# Patient Record
(demographics unavailable — no encounter records)

---

## 2017-07-14 NOTE — OR
DATE:  07/14/2017

 

PREOPERATIVE DIAGNOSES:

1. Abdominal bleeding.

2. Developmentally disabled.

 

POSTOPERATIVE DIAGNOSES:

1. Abdominal bleeding.

2. Developmentally disabled.

3. Simple right ovarian cyst and irregular endometrial cavity.

 

PROCEDURES:  Exam under anesthesia, Pap smear, endometrial biopsy, dilation and

curettage, and ultrasound transvaginal and transabdominal under anesthesia.

 

ESTIMATED BLOOD LOSS:  5 mL.

 

FLUIDS:  Crystalloid/LR.

 

DRAINS:  None.

 

PATHOLOGY:  Endometrial curettings sent.  Pap smear sent.

 

ANESTHESIA:  General.

 

FINDINGS:  Irregular endometrial cavity with right ovarian cyst, unable to see

left ovary on ultrasound.

 

PROCEDURE IN DETAIL:  The patient was taken back to the operating room.  She was

placed supine on the operating table after IV access was obtained.  General

anesthesia was accomplished.  The patient was placed in the dorsal lithotomy

position.  Pap smear was then accomplished.  Then, a transabdominal ultrasound

was accomplished.  The patient was then prepped and draped in usual sterile

fashion in the dorsal lithotomy position.  Anterior lip of the cervix was

grasped with a single-tooth tenaculum.  Endometrial biopsy was accomplished.

Then, the cervix was dilated up to a #8 Hegar dilator, and a D and C was then

accomplished.  All tissue was sent to Pathology.  The instruments were removed

from the vagina.  Bimanual examination revealed moderate-size uterus.  The

cervix, vagina, external genitalia, all appeared normal.  Extremities:  No

edema, erythema, or tenderness noted.  Abdomen:  Soft, nontender.  Bowel sounds

good.  No rigidity, rebound tenderness, or peritoneal signs noted.  Breasts:  No

erythema, lesions, dimpling, retractions, or discharge noted.  No masses or

lesions noted.  Axilla:  No adenopathy noted.  Lungs:  Clear to auscultation.

No wheezing, rhonchi, or rales noted.  Cardiovascular:  Regular rate and rhythm

without murmurs.  Neck:  No masses, fullness, or lesions noted.  The patient

tolerated the procedure quite well.  All sponges, needle, and instrument counts

were correct by the nurse in attendance x2.  The patient was taken to PACU awake

and in stable condition.  The patient did receive 2 g of Ancef IV.

 

DD:  07/14/2017 09:15:46

DT:  07/14/2017 12:54:12

MODL

Job #:  [id number]

## 2018-08-12 NOTE — EDM.PDOC
ED HPI GENERAL MEDICAL PROBLEM





- General


Chief Complaint: Upper Extremity Injury/Pain


Stated Complaint: HAND PAIN [id number]


Time Seen by Provider: 08/12/18 20:50


Source of Information: Reports: Other ([facility] staff)


History Limitations: Reports: No Limitations





- History of Present Illness


INITIAL COMMENTS - FREE TEXT/NARRATIVE: 





 ED with [facility] staff, fell  forward Friday, Landing outstretched hands. 

Has not been using left wrist since. Staff report hx frequent fall.  Patient 

nonverbal. Prior to falls would  object with both hands Now will move 

arm but avoid grasping. 


  ** Left Hand


Pain Score (Numeric/FACES): 4





- Related Data


 Allergies











Allergy/AdvReac Type Severity Reaction Status Date / Time


 


ketamine Allergy  Cannot Verified 08/12/18 19:37





   Remember  











Home Meds: 


 Home Meds





Calcium Carb & Citrate/Vit D3 [Calcium + Vitamin D3 Caplet] 1 each PO BID 04/05/ 14 [History]


Lactulose [Chronulac] 2 tbsp PO BEDTIME 04/05/14 [History]


Levothyroxine Sodium [Synthroid] 50 mcg PO DAILY 04/05/14 [History]


Propranolol [Inderal] 10 mg PO TID 04/05/14 [History]


Valproic Acid (As Sodium Salt) [Valproic Acid] 500 mg PO TID 04/05/14 [History]


lamoTRIgine [LaMICtal] 75 mg PO BID 04/05/14 [History]


medroxyPROGESTERone Acetate [Depo-Provera] 150 mg IM .[id number] 04/05/14 [History]


atorvaSTATin [Lipitor] 1 tab PO BEDTIME 05/26/16 [History]


Benzonatate 200 mg PO TID PRN 06/08/17 [History]


Carbamide Peroxide [Debrox 6.5% Otic Soln] 1 drop EARBOTH ASDIRECTED 06/08/17 [

History]


Loratadine 1 tab PO DAILY PRN 06/08/17 [History]


metFORMIN HCl [Metformin HCl ER] 1 tab PO BID 06/08/17 [History]











Past Medical History


HEENT History: Reports: None


Cardiovascular History: Reports: None


Respiratory History: Reports: None


Gastrointestinal History: Reports: None


Genitourinary History: Reports: None


OB/GYN History: Reports: None, Other (See Below)


Other OB/GYN History: IRREGLAR PERIODS


Musculoskeletal History: Reports: None


Neurological History: Reports: Seizure


Psychiatric History: Reports: Other (See Below)


Other Psychiatric History: intellectually disability


Endocrine/Metabolic History: Reports: Diabetes, Type II, Hypothyroidism


Hematologic History: Reports: None


Immunologic History: Reports: None


Oncologic (Cancer) History: Reports: None


Dermatologic History: Reports: None





- Infectious Disease History


Infectious Disease History: Reports: None





- Past Surgical History


Head Surgeries/Procedures: Reports: None


HEENT Surgical History: Reports: None





Social & Family History





- Family History


Family Medical History: Noncontributory





- Tobacco Use


Smoking Status *Q: Never Smoker





- Caffeine Use


Caffeine Use: Reports: None





- Recreational Drug Use


Recreational Drug Use: No





Review of Systems





- Review of Systems


Review Of Systems: ROS reveals no pertinent complaints other than HPI.





ED EXAM, GENERAL





- Physical Exam


Exam: See Below


Exam Limited By: No Limitations


General Appearance: Alert, No Apparent Distress, Anxious (Non verbal)


Ears: Normal External Exam


Nose: Normal Inspection


Throat/Mouth: Normal Inspection


Head: Atraumatic, Normocephalic


Neck: Full Range of Motion


Respiratory/Chest: No Respiratory Distress, Lungs Clear


Cardiovascular: Normal Peripheral Pulses, Regular Rate, Rhythm


Extremities: Other (Light gree bruising left  inner wrist. No deformity right 

wrist, no bruising, No limiting elbow or shoulder, tender and withdraws with 

palpation of inner wrist. )


Neurological: Alert.  No: Normal Cognition


Psychiatric: Anxious


Skin Exam: Warm, Dry, Intact, Ecchymosis ( right wrist, ).  No: Wound/Incision





Course





- Vital Signs


Last Recorded V/S: 


 Last Vital Signs











Temp      


 


Pulse      


 


Resp  18   08/12/18 19:28


 


BP      


 


Pulse Ox      














- Radiology Interpretation


Free Text/Narrative:: 





xray left wrist negative.





Departure





- Departure


Time of Disposition: 21:22


Disposition: Home, Self-Care 01


Condition: Good


Clinical Impression: 


 Wrist pain, left








- Discharge Information


Instructions:  Wrist Pain, Adult, Easy-to-Read


Forms:  ED Department Discharge


Additional Instructions: 


Splint to left wrist


off for showers


clinic follow up for re xray


alternate tylenol and ibuprofen  for discomfort